# Patient Record
Sex: MALE | Race: WHITE | NOT HISPANIC OR LATINO | URBAN - METROPOLITAN AREA
[De-identification: names, ages, dates, MRNs, and addresses within clinical notes are randomized per-mention and may not be internally consistent; named-entity substitution may affect disease eponyms.]

---

## 2017-09-29 ENCOUNTER — PRE VISIT (OUTPATIENT)
Dept: ORTHOPEDICS | Facility: CLINIC | Age: 52
End: 2017-09-29

## 2017-09-29 NOTE — TELEPHONE ENCOUNTER
1.  Date/reason for appt: 10/2/17- Right wrist cell tumor     2.  Referring provider: Dr. Berny Pham     3.  Call to patient (Yes / No - short description): No - pt is referred.     4.  Previous care at / records requested from:     1. [facility] - faxed cover sheet    2. [facility] - faxed cover sheet for report.     -MRI on 8/17/17 (Received in PACS.)

## 2017-10-02 ENCOUNTER — OFFICE VISIT (OUTPATIENT)
Dept: ORTHOPEDICS | Facility: CLINIC | Age: 52
End: 2017-10-02

## 2017-10-02 VITALS — HEIGHT: 74 IN | BODY MASS INDEX: 31.83 KG/M2 | WEIGHT: 248 LBS

## 2017-10-02 DIAGNOSIS — D48.9 GIANT CELL TUMOR: Primary | ICD-10-CM

## 2017-10-02 NOTE — TELEPHONE ENCOUNTER
Records received from [facility]. Forwarded to clinic.    Included:  Office note: 7/28/17  Other: Office notes from [facility] 2011   -Genetic Report 4/26/12  Op notes:    -2/5/15 Right distal Radius Giant Cell Tumor    -1/18/12 Cast removal    -12/13/11 Cast Change    -4/13/10 Exam, Xrays, Cast application    -3/11/10 suture removal, cast, evaluation    -2/17/10 burring of lesion, curettage, evacuation of lesion, excision     -2/19/10 Splint removal, exam, cast   Radiology:    -MRI R Wrist 8/17/17 ([facility])   -R Wrist 2/5/15 ([place])    -CT Upper Ext 12/28/11   -XR R Wrist [id number] ([facility])   Pathology:   -2/3/15, 10/27/11, 2/17/10: Right Radius Excision ([place])

## 2017-10-02 NOTE — TELEPHONE ENCOUNTER
Radiology reports received from [place].   Forwarded to [facility]. (Images were already pushed to PACS.)

## 2017-10-02 NOTE — PROGRESS NOTES
[facility], Orthopaedic Oncology Surgery Consultation  by Delfino Yang M.D.    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 I see no evidence of relapse disease based upon plain films and MRI examination.    He has an excellent functional result with full motion of the wrist.     Plan:  I have advised that he have a x-ray and physical examination performed yearly. If he should have symptoms then an MRI examination would be appropriate as well.  We discussed the potential for pulmonary metastases although the incidence is quite low at 1% of patients. This would rise somewhat with his history of relapsed disease. I do not think routine lung imaging is necessary unless he should become symptomatic.    Prior x-rays will be obtained for comparison to current films given the small area of lucency noticed the distal radius. However I think it is unlikely represent a local recurrence.    Regular follow-up can be done either locally or with Dr. Skelton are myself.           History of Present Illness:   52 year old male  chief complaint    R hand dominant  Hx of GCT R distal radius.   Here for f/u due to health plan recommendation.  Last saw Dr. Skelton 2.5 years ago.  Presently, minimal sx's or pain in R wrist. Not taking any pain meds.  Never has taken adjuvant meds, denosumab or bisphosphonates.    Working full time as  and now doing union admin work.    Background history:  DX:  1. Giant cell tumor  2. Spontaneous pneumothorax 1983  3. Bariatric surgery 2013      TREATMENTS:  1. 2011, Right Wrist Mass/Giant Cell Excision, cementation, Dr. Skelton, [place]  2. 2013, Right Wrist Mass/Giant Cell Excision, cementation, Dr. Skelton,  "[place]  3. 2015, Right Wrist Mass/Giant Cell Excision, cementation, Dr. Skelton, [place]            Physical Exam:     EXAMINATION pertinent findings:   VITAL SIGNS: Height 1.88 m (6' 2\"), weight 112.5 kg (248 lb).  Body mass index is 31.84 kg/(m^2).  RESP: non labored breathing   ABD: benign   SKIN: grossly normal   LYMPHATIC: grossly normal   NEURO: grossly normal . 5 over 5 intact function of EPL ECRB ECRL and EDC FPL  VASCULAR: satisfactory perfusion of all extremities   MUSCULOSKELETAL:   Right wrist has 2 prior incisions along the volar and radial aspect. Range of motion reveals dorsiflexion of 94 flexion 90 supination 90 pronation 90. No tenderness. No soft tissue swelling present. Incisions are well-healed.           Data:   All laboratory data reviewed  All imaging studies reviewed by me    CXR: negative.  Prior staple line L lung noted.          MRI from [facility]:           Delfino Yang MD  [facility] Family Professor  Oncology and Adult Reconstructive Surgery  Dept Orthopaedic Surgery, [facility]  [phone] office, [phone] pager  [email or web address]            DATA for DOCUMENTATION:         Past Medical History:   There is no problem list on file for this patient.    No past medical history on file.    Also see scanned health assessment forms.       Past Surgical History:   No past surgical history on file.         Social History:     Social History     Social History     Marital status: Single     Spouse name: N/A     Number of children: N/A     Years of education: N/A     Occupational History     Not on file.     Social History Main Topics     Smoking status: Never Smoker     Smokeless tobacco: Never Used     Alcohol use Not on file     Drug use: Not on file     Sexual activity: Not on file     Other Topics Concern     Not on file     Social History Narrative     No narrative on file            Family History:     No family history on file.         Medications:     Current Outpatient Prescriptions "   Medication Sig     Acetaminophen (TYLENOL PO)      No current facility-administered medications for this visit.               Review of Systems:   A comprehensive 10 point review of systems (constitutional, ENT, cardiac, peripheral vascular, lymphatic, respiratory, GI, , Musculoskeletal, skin, Neurological) was performed and found to be negative except as described in this note.     See intake form completed by patient

## 2017-10-02 NOTE — MR AVS SNAPSHOT
"              After Visit Summary   10/2/2017    Dakota Martínez    MRN: [id number]           Patient Information     Date Of Birth          1965        Visit Information        Provider Department      10/2/2017 8:00 AM Delfino Yang MD [facility]        Today's Diagnoses     Giant cell tumor    -  1       Follow-ups after your visit        Who to contact     Please call your clinic at [phone] to:    Ask questions about your health    Make or cancel appointments    Discuss your medicines    Learn about your test results    Speak to your doctor   If you have compliments or concerns about an experience at your clinic, or if you wish to file a complaint, please contact [facility] Patient Relations at [phone] or email us at [email or web address]         Additional Information About Your Visit        MyChart Information     [employer] is an electronic gateway that provides easy, online access to your medical records. With [employer], you can request a clinic appointment, read your test results, renew a prescription or communicate with your care team.     To sign up for [employer] visit the website at [email or web address]   You will be asked to enter the access code listed below, as well as some personal information. Please follow the directions to create your username and password.     Your access code is: [email or web address]  Expires: 2017  6:30 AM     Your access code will  in 90 days. If you need help or a new code, please contact your [facility] or call [phone] for assistance.        Care EveryWhere ID     This is your Care EveryWhere ID. This could be used by other organizations to access your [place] medical records  [id number]        Your Vitals Were     Height BMI (Body Mass Index)                1.88 m (6' 2\") 31.84 kg/m2           Blood Pressure from Last 3 Encounters:   No data found for BP    Weight " from Last 3 Encounters:   10/02/17 112.5 kg (248 lb)               Primary Care Provider Office Phone # Fax #    Berny Pham [phone]       [facility] [address] [facility] [address]        Equal Access to Services     MERCYELPIDIO AUTUMN : Hadii aad ku hadasho Soomaali, waaxda luqadaha, qaybta kaalmada adeegyada, waxay idiin hayaan adeeg kharash la'lingn ah. So [facility] [phone].    ATENCIÓN: Si habla español, tiene a morrell disposición servicios gratuitos de asistencia lingüística. Llame al [phone].    We comply with applicable federal civil rights laws and [place] laws. We do not discriminate on the basis of race, color, national origin, age, disability, sex, sexual orientation, or gender identity.            Thank you!     Thank you for choosing [facility]  for your care. Our goal is always to provide you with excellent care. Hearing back from our patients is one way we can continue to improve our services. Please take a few minutes to complete the written survey that you may receive in the mail after your visit with us. Thank you!             Your Updated Medication List - Protect others around you: Learn how to safely use, store and throw away your medicines at www.disposemymeds.org.          This list is accurate as of: 10/2/17  9:35 AM.  Always use your most recent med list.                   Brand Name Dispense Instructions for use Diagnosis    TYLENOL PO       Giant cell tumor

## 2017-10-02 NOTE — LETTER
10/2/2017       RE: Dakota Martínez  [address]   [facility] [address]     Dear Colleague,    Thank you for referring your patient, Dakota Martínez, to the [facility] at [facility]. Please see a copy of my visit note below.        [facility], Orthopaedic Oncology Surgery Consultation  by Delfino Yang M.D.    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 I see no evidence of relapse disease based upon plain films and MRI examination.    He has an excellent functional result with full motion of the wrist.     Plan:  I have advised that he have a x-ray and physical examination performed yearly. If he should have symptoms then an MRI examination would be appropriate as well.  We discussed the potential for pulmonary metastases although the incidence is quite low at 1% of patients. This would rise somewhat with his history of relapsed disease. I do not think routine lung imaging is necessary unless he should become symptomatic.    Prior x-rays will be obtained for comparison to current films given the small area of lucency noticed the distal radius. However I think it is unlikely represent a local recurrence.    Regular follow-up can be done either locally or with Dr. Skelton are myself.           History of Present Illness:   52 year old male  chief complaint    R hand dominant  Hx of GCT R distal radius.   Here for f/u due to health plan recommendation.  Last saw Dr. Skelton 2.5 years ago.  Presently, minimal sx's or pain in R wrist. Not taking any pain meds.  Never has taken adjuvant meds, denosumab or bisphosphonates.    Working full time as  and now doing union admin  "work.    Background history:  DX:  1. Giant cell tumor  2. Spontaneous pneumothorax 1983  3. Bariatric surgery 2013      TREATMENTS:  1. 2011, Right Wrist Mass/Giant Cell Excision, cementation, [place] [facility]  2. 2013, Right Wrist Mass/Giant Cell Excision, cementation, [facility]  3. 2015, Right Wrist Mass/Giant Cell Excision, cementation, [facility]            Physical Exam:     EXAMINATION pertinent findings:   VITAL SIGNS: Height 1.88 m (6' 2\"), weight 112.5 kg (248 lb).  Body mass index is 31.84 kg/(m^2).  RESP: non labored breathing   ABD: benign   SKIN: grossly normal   LYMPHATIC: grossly normal   NEURO: grossly normal . 5 over 5 intact function of EPL ECRB ECRL and EDC FPL  VASCULAR: satisfactory perfusion of all extremities   MUSCULOSKELETAL:   Right wrist has 2 prior incisions along the volar and radial aspect. Range of motion reveals dorsiflexion of 94 flexion 90 supination 90 pronation 90. No tenderness. No soft tissue swelling present. Incisions are well-healed.           Data:   All laboratory data reviewed  All imaging studies reviewed by me    CXR: negative.  Prior staple line L lung noted.          MRI from [facility]:           Delfino Yang MD  [facility] Family Professor  Oncology and Adult Reconstructive Surgery  Dept Orthopaedic Surgery, [facility]  [phone] office, [phone] pager  [email or web address]            DATA for DOCUMENTATION:         Past Medical History:   There is no problem list on file for this patient.    No past medical history on file.    Also see scanned health assessment forms.       Past Surgical History:   No past surgical history on file.         Social History:     Social History     Social History     Marital status: Single     Spouse name: N/A     Number of children: N/A     Years of education: N/A     Occupational History     Not on file.     Social History Main Topics     Smoking status: Never Smoker     Smokeless tobacco: Never Used     Alcohol use " Not on file     Drug use: Not on file     Sexual activity: Not on file     Other Topics Concern     Not on file     Social History Narrative     No narrative on file            Family History:     No family history on file.         Medications:     Current Outpatient Prescriptions   Medication Sig     Acetaminophen (TYLENOL PO)      No current facility-administered medications for this visit.               Review of Systems:   A comprehensive 10 point review of systems (constitutional, ENT, cardiac, peripheral vascular, lymphatic, respiratory, GI, , Musculoskeletal, skin, Neurological) was performed and found to be negative except as described in this note.     See intake form completed by patient        Again, thank you for allowing me to participate in the care of your patient.      Sincerely,    Delfino Yang MD

## 2017-10-05 ENCOUNTER — MEDICAL CORRESPONDENCE (OUTPATIENT)
Dept: HEALTH INFORMATION MANAGEMENT | Facility: CLINIC | Age: 52
End: 2017-10-05